# Patient Record
Sex: MALE | Race: WHITE | NOT HISPANIC OR LATINO | URBAN - METROPOLITAN AREA
[De-identification: names, ages, dates, MRNs, and addresses within clinical notes are randomized per-mention and may not be internally consistent; named-entity substitution may affect disease eponyms.]

---

## 2020-07-25 ENCOUNTER — TELEPHONE ENCOUNTER (OUTPATIENT)
Dept: URBAN - METROPOLITAN AREA CLINIC 13 | Facility: CLINIC | Age: 65
End: 2020-07-25

## 2020-07-25 RX ORDER — OMEPRAZOLE 40 MG/1
TAKE ONE CAPSULE BY MOUTH EVERY DAY CAPSULE, DELAYED RELEASE ORAL
Qty: 30 | Refills: 3 | OUTPATIENT
Start: 2012-04-17 | End: 2017-03-22

## 2020-07-26 ENCOUNTER — TELEPHONE ENCOUNTER (OUTPATIENT)
Dept: URBAN - METROPOLITAN AREA CLINIC 13 | Facility: CLINIC | Age: 65
End: 2020-07-26

## 2020-07-26 RX ORDER — METOPROLOL TARTRATE 25 MG/1
TAKE 1 TABLET DAILY TABLET, FILM COATED ORAL
Refills: 0 | Status: ACTIVE | COMMUNITY

## 2020-07-26 RX ORDER — METOPROLOL SUCCINATE 50 MG/1
TABLET, EXTENDED RELEASE ORAL
Qty: 30 | Refills: 0 | Status: ACTIVE | COMMUNITY
Start: 2018-12-06

## 2020-07-26 RX ORDER — VALACYCLOVIR 1 G/1
TABLET, FILM COATED ORAL
Qty: 14 | Refills: 0 | Status: ACTIVE | COMMUNITY
Start: 2019-04-03

## 2020-07-26 RX ORDER — CEPHALEXIN 500 MG/1
CAPSULE ORAL
Qty: 21 | Refills: 0 | Status: ACTIVE | COMMUNITY
Start: 2019-04-03

## 2020-07-26 RX ORDER — OMEPRAZOLE 40 MG/1
TAKE 1 CAPSULE BY MOUTH DAILY 30 MINUTES BEFORE BREAKFAST CAPSULE, DELAYED RELEASE ORAL
Qty: 90 | Refills: 3 | Status: ACTIVE | COMMUNITY
Start: 2017-03-22

## 2020-07-26 RX ORDER — ALLOPURINOL 300 MG/1
TAKE 1 TABLET BY MOUTH ONCE DAILY TABLET ORAL
Qty: 30 | Refills: 0 | Status: ACTIVE | COMMUNITY
Start: 2018-12-07

## 2020-09-02 ENCOUNTER — TELEPHONE ENCOUNTER (OUTPATIENT)
Dept: URBAN - METROPOLITAN AREA CLINIC 113 | Facility: CLINIC | Age: 65
End: 2020-09-02

## 2020-09-23 ENCOUNTER — TELEPHONE ENCOUNTER (OUTPATIENT)
Dept: URBAN - METROPOLITAN AREA CLINIC 113 | Facility: CLINIC | Age: 65
End: 2020-09-23

## 2020-09-28 ENCOUNTER — WEB ENCOUNTER (OUTPATIENT)
Dept: URBAN - METROPOLITAN AREA CLINIC 113 | Facility: CLINIC | Age: 65
End: 2020-09-28

## 2020-09-28 ENCOUNTER — LAB OUTSIDE AN ENCOUNTER (OUTPATIENT)
Dept: URBAN - METROPOLITAN AREA CLINIC 113 | Facility: CLINIC | Age: 65
End: 2020-09-28

## 2020-09-28 ENCOUNTER — OFFICE VISIT (OUTPATIENT)
Dept: URBAN - METROPOLITAN AREA CLINIC 113 | Facility: CLINIC | Age: 65
End: 2020-09-28
Payer: MEDICARE

## 2020-09-28 VITALS
TEMPERATURE: 98.2 F | RESPIRATION RATE: 18 BRPM | HEIGHT: 67 IN | DIASTOLIC BLOOD PRESSURE: 83 MMHG | WEIGHT: 226 LBS | HEART RATE: 68 BPM | SYSTOLIC BLOOD PRESSURE: 160 MMHG | BODY MASS INDEX: 35.47 KG/M2

## 2020-09-28 DIAGNOSIS — K22.70 BARRETT'S ESOPHAGUS WITHOUT DYSPLASIA: ICD-10-CM

## 2020-09-28 DIAGNOSIS — K76.0 FATTY LIVER: ICD-10-CM

## 2020-09-28 DIAGNOSIS — K21.9 GASTROESOPHAGEAL REFLUX DISEASE, ESOPHAGITIS PRESENCE NOT SPECIFIED: ICD-10-CM

## 2020-09-28 DIAGNOSIS — R74.8 ELEVATED LIVER ENZYMES: ICD-10-CM

## 2020-09-28 PROCEDURE — 83516 IMMUNOASSAY NONANTIBODY: CPT | Performed by: NURSE PRACTITIONER

## 2020-09-28 PROCEDURE — 99204 OFFICE O/P NEW MOD 45 MIN: CPT | Performed by: NURSE PRACTITIONER

## 2020-09-28 PROCEDURE — 82104 ALPHA-1-ANTITRYPSIN PHENO: CPT | Performed by: NURSE PRACTITIONER

## 2020-09-28 PROCEDURE — 99244 OFF/OP CNSLTJ NEW/EST MOD 40: CPT | Performed by: NURSE PRACTITIONER

## 2020-09-28 PROCEDURE — 82787 IGG 1 2 3 OR 4 EACH: CPT | Performed by: NURSE PRACTITIONER

## 2020-09-28 RX ORDER — OMEPRAZOLE 40 MG/1
TAKE 1 CAPSULE BY MOUTH DAILY 30 MINUTES BEFORE BREAKFAST CAPSULE, DELAYED RELEASE ORAL
Qty: 90 | Refills: 3 | Status: ACTIVE | COMMUNITY
Start: 2017-03-22

## 2020-09-28 RX ORDER — ALLOPURINOL 300 MG/1
TAKE 1 TABLET BY MOUTH ONCE DAILY TABLET ORAL
Qty: 30 | Refills: 0 | Status: ACTIVE | COMMUNITY
Start: 2018-12-07

## 2020-09-28 RX ORDER — AMLODIPINE BESYLATE 10 MG/1
1 TABLET TABLET ORAL ONCE A DAY
Status: ACTIVE | COMMUNITY

## 2020-09-28 NOTE — HPI-TODAY'S VISIT:
This is a 64-year-old male with a history of Rodriguez's esophagus, GERD and colon diverticulosis referred from Dr. Miranda for evaluation of liver enzyme elevation.    He was last seen in .  He was scheduled for an EGD which was canceled.  He did not return for follow-up.  Colon cancer screening is up-to-date and due in .    Labs 2020: BMP normal with exception of glucose 159.  LFTs: TB 0.4, ALP 94, , AST 66.  Hepatitis A antibody IgM positive, hepatitis B surface antigen negative, hepatitis B core antibody negative, hepatitis C antibody negative.  He may drink 1 beer a month.  He denies a family history of liver disease.  GERD is well controlled with Omeprazole.  He denies any other abdominal symptoms.  He has a cousin and an aunt who both  of colon cancer in their 70s.  He denies a history of colon cancer in first-degree relatives.

## 2020-10-19 ENCOUNTER — CLAIMS CREATED FROM THE CLAIM WINDOW (OUTPATIENT)
Dept: URBAN - METROPOLITAN AREA CLINIC 4 | Facility: CLINIC | Age: 65
End: 2020-10-19
Payer: MEDICARE

## 2020-10-19 ENCOUNTER — OFFICE VISIT (OUTPATIENT)
Dept: URBAN - METROPOLITAN AREA SURGERY CENTER 25 | Facility: SURGERY CENTER | Age: 65
End: 2020-10-19
Payer: MEDICARE

## 2020-10-19 DIAGNOSIS — T18.2XXA GASTRIC BEZOAR, INITIAL ENCOUNTER: ICD-10-CM

## 2020-10-19 DIAGNOSIS — K21.9 ACID REFLUX: ICD-10-CM

## 2020-10-19 DIAGNOSIS — K22.70 BARRETT ESOPHAGUS: ICD-10-CM

## 2020-10-19 DIAGNOSIS — K22.70 BARRETT'S ESOPHAGUS WITHOUT DYSPLASIA: ICD-10-CM

## 2020-10-19 PROCEDURE — G8907 PT DOC NO EVENTS ON DISCHARG: HCPCS | Performed by: INTERNAL MEDICINE

## 2020-10-19 PROCEDURE — 43239 EGD BIOPSY SINGLE/MULTIPLE: CPT | Performed by: INTERNAL MEDICINE

## 2020-10-19 PROCEDURE — 88305 TISSUE EXAM BY PATHOLOGIST: CPT | Performed by: PATHOLOGY

## 2020-10-19 RX ORDER — AMLODIPINE BESYLATE 10 MG/1
1 TABLET TABLET ORAL ONCE A DAY
Status: ACTIVE | COMMUNITY

## 2020-10-19 RX ORDER — OMEPRAZOLE 40 MG/1
TAKE 1 CAPSULE BY MOUTH DAILY 30 MINUTES BEFORE BREAKFAST CAPSULE, DELAYED RELEASE ORAL
Qty: 90 | Refills: 3 | Status: ACTIVE | COMMUNITY
Start: 2017-03-22

## 2020-10-19 RX ORDER — ALLOPURINOL 300 MG/1
TAKE 1 TABLET BY MOUTH ONCE DAILY TABLET ORAL
Qty: 30 | Refills: 0 | Status: ACTIVE | COMMUNITY
Start: 2018-12-07

## 2021-01-19 ENCOUNTER — OFFICE VISIT (OUTPATIENT)
Dept: URBAN - METROPOLITAN AREA CLINIC 113 | Facility: CLINIC | Age: 66
End: 2021-01-19
Payer: MEDICARE

## 2021-01-19 ENCOUNTER — LAB OUTSIDE AN ENCOUNTER (OUTPATIENT)
Dept: URBAN - METROPOLITAN AREA CLINIC 113 | Facility: CLINIC | Age: 66
End: 2021-01-19

## 2021-01-19 VITALS
DIASTOLIC BLOOD PRESSURE: 91 MMHG | WEIGHT: 235 LBS | SYSTOLIC BLOOD PRESSURE: 160 MMHG | HEIGHT: 67 IN | HEART RATE: 96 BPM | RESPIRATION RATE: 20 BRPM | BODY MASS INDEX: 36.88 KG/M2 | TEMPERATURE: 98.2 F

## 2021-01-19 DIAGNOSIS — K22.70 BARRETT'S ESOPHAGUS WITHOUT DYSPLASIA: ICD-10-CM

## 2021-01-19 DIAGNOSIS — K76.0 FATTY LIVER: ICD-10-CM

## 2021-01-19 DIAGNOSIS — R74.8 ELEVATED LIVER ENZYMES: ICD-10-CM

## 2021-01-19 DIAGNOSIS — K21.9 GASTROESOPHAGEAL REFLUX DISEASE, ESOPHAGITIS PRESENCE NOT SPECIFIED: ICD-10-CM

## 2021-01-19 PROCEDURE — 99214 OFFICE O/P EST MOD 30 MIN: CPT | Performed by: INTERNAL MEDICINE

## 2021-01-19 RX ORDER — LISINOPRIL 10 MG/1
1 TABLET TABLET ORAL ONCE A DAY
Status: ACTIVE | COMMUNITY

## 2021-01-19 RX ORDER — ALLOPURINOL 300 MG/1
TAKE 1 TABLET BY MOUTH ONCE DAILY TABLET ORAL
Qty: 30 | Refills: 0 | Status: ACTIVE | COMMUNITY
Start: 2018-12-07

## 2021-01-19 RX ORDER — OMEPRAZOLE 40 MG/1
TAKE 1 CAPSULE BY MOUTH DAILY 30 MINUTES BEFORE BREAKFAST CAPSULE, DELAYED RELEASE ORAL
Qty: 90 | Refills: 3 | Status: ACTIVE | COMMUNITY
Start: 2017-03-22

## 2021-01-19 NOTE — HPI-TODAY'S VISIT:
This is a 64-year-old male with a history of Rodriguez's esophagus, GERD and colon diverticulosis Initially referred from Dr. Miranda for evaluation of liver enzyme elevation.  He was last seen here in 2020.  Labs 2020: BMP normal with exception of glucose 159.  LFTs: TB 0.4, ALP 94, , AST 66.  Hepatitis A antibody IgM positive, hepatitis B surface antigen negative, hepatitis B core antibody negative, hepatitis C antibody negative.  He may drink 1 beer a month.  He denies a family history of liver disease.  GERD is well controlled with Omeprazole.  He denies any other abdominal symptoms.  He has a cousin and an aunt who both  of colon cancer in their 70s.  He denies a history of colon cancer in first-degree relatives.    Colon cancer screening is up-to-date and due in .    After his last visit, we ordered labs including antimitochondrial antibody, antinuclear antibody, anti-smooth muscle a body, iron studies, and a repeat set of liver enzymes.  He never had these done.  Because he has a long-standing history of reflux symptoms, and a history of Rodriguez's epithelium, he underwent upper GI endoscopy on 2020.  This did indeed reveal short segment Rodriguez's epithelium without dysplasia, which is confirmed by biopsy.  Notably, the patient was also found to have a gastric bezoar.  He returns today with episodic bouts of nausea and vomiting, including one that occurred last evening.  He states it 2 AM he awoke with pain in the center of his stomach, and had the sudden onset of nausea and vomiting subsequently, throughout most of the meal he had for dinner the night before as though it was untouched.  He then vomited bile.  He denies any hematemesis.  He's had episodes like this before.

## 2021-01-21 PROBLEM — 235675006: Status: ACTIVE | Noted: 2021-01-21

## 2021-04-20 ENCOUNTER — OFFICE VISIT (OUTPATIENT)
Dept: URBAN - METROPOLITAN AREA CLINIC 113 | Facility: CLINIC | Age: 66
End: 2021-04-20

## 2021-04-20 RX ORDER — ALLOPURINOL 300 MG/1
TAKE 1 TABLET BY MOUTH ONCE DAILY TABLET ORAL
Qty: 30 | Refills: 0 | Status: ACTIVE | COMMUNITY
Start: 2018-12-07

## 2021-04-20 RX ORDER — OMEPRAZOLE 40 MG/1
TAKE 1 CAPSULE BY MOUTH DAILY 30 MINUTES BEFORE BREAKFAST CAPSULE, DELAYED RELEASE ORAL
Qty: 90 | Refills: 3 | Status: ACTIVE | COMMUNITY
Start: 2017-03-22

## 2021-04-20 RX ORDER — LISINOPRIL 10 MG/1
1 TABLET TABLET ORAL ONCE A DAY
Status: ACTIVE | COMMUNITY

## 2021-07-09 ENCOUNTER — OFFICE VISIT (OUTPATIENT)
Dept: URBAN - METROPOLITAN AREA CLINIC 113 | Facility: CLINIC | Age: 66
End: 2021-07-09
Payer: MEDICARE

## 2021-07-09 VITALS
BODY MASS INDEX: 36.1 KG/M2 | HEIGHT: 67 IN | DIASTOLIC BLOOD PRESSURE: 91 MMHG | SYSTOLIC BLOOD PRESSURE: 160 MMHG | HEART RATE: 68 BPM | RESPIRATION RATE: 18 BRPM | WEIGHT: 230 LBS | TEMPERATURE: 98.1 F

## 2021-07-09 DIAGNOSIS — K22.70 BARRETT'S ESOPHAGUS WITHOUT DYSPLASIA: ICD-10-CM

## 2021-07-09 DIAGNOSIS — K31.84 GASTROPARESIS: ICD-10-CM

## 2021-07-09 DIAGNOSIS — R74.8 ELEVATED LIVER ENZYMES: ICD-10-CM

## 2021-07-09 DIAGNOSIS — K21.9 GASTROESOPHAGEAL REFLUX DISEASE, ESOPHAGITIS PRESENCE NOT SPECIFIED: ICD-10-CM

## 2021-07-09 DIAGNOSIS — K76.0 FATTY LIVER: ICD-10-CM

## 2021-07-09 PROCEDURE — 99214 OFFICE O/P EST MOD 30 MIN: CPT | Performed by: INTERNAL MEDICINE

## 2021-07-09 RX ORDER — OMEPRAZOLE 40 MG/1
TAKE 1 CAPSULE BY MOUTH DAILY 30 MINUTES BEFORE BREAKFAST CAPSULE, DELAYED RELEASE ORAL
Qty: 90 | Refills: 3 | Status: ACTIVE | COMMUNITY
Start: 2017-03-22

## 2021-07-09 RX ORDER — LISINOPRIL 10 MG/1
1 TABLET TABLET ORAL ONCE A DAY
Status: ACTIVE | COMMUNITY

## 2021-07-09 RX ORDER — ALLOPURINOL 300 MG/1
TAKE 1 TABLET BY MOUTH ONCE DAILY TABLET ORAL
Qty: 30 | Refills: 0 | Status: ACTIVE | COMMUNITY
Start: 2018-12-07

## 2021-07-09 NOTE — HPI-TODAY'S VISIT:
This is a 65-year-old male with a history of Rodriguez's esophagus, GERD and colon diverticulosis Initially referred from Dr. Miranda for evaluation of liver enzyme elevation.  He was last seen here on 2021.  Labs 2020: BMP normal with exception of glucose 159.  LFTs: TB 0.4, ALP 94, , AST 66.  Hepatitis A antibody IgM positive, hepatitis B surface antigen negative, hepatitis B core antibody negative, hepatitis C antibody negative.  Other labs are ordered at the time of that last visit, including Antimitochondrial antibody, antinuclear antibody, anti-smooth muscle Antibody, etc.  He may drink 1 beer a month.  He denies a family history of liver disease.  GERD is well controlled with Omeprazole.  He denies any other abdominal symptoms.    Colon cancer screening is up-to-date and due in .  He does have a family history of colon cancerIn a cousin and an aunt, both of them  of colon cancer in her 70s.  There are no first degree family members with colon cancer.   Because he has a long-standing history of reflux symptoms, and a history of Rodriguez's epithelium, he underwent upper GI endoscopy on 2020.  This did indeed reveal short segment Rodriguez's epithelium without dysplasia, which is confirmed by biopsy.  Notably, the patient was also found to have a gastric bezoar.  He returned In 2021 with episodic bouts of nausea and vomiting. He denies any hematemesis.  He's had episodes like this before. There was a presumption that this represented gastroparesis, based on the finding of a gastric bezoar.  The plan was to empirically treat him with domperidone.  Because of his liver enzyme elevation, we also elected to complete his elevated liver enzyme workup. Repeat liveer enzymes were almost normal, with an AST of 33, ALT of 61, iron saturation of 24% and a weakly positivve antinuclear anybody at L1 to 20 titer.  His antimitochondrial antibody was negative.  The patient elected not to begin domperidone after he read the side effect profile of the medication.  He had a kidney stone removed by Dr. Galdamez of urology recently, and his nausea and vomiting completely resolved.  The patient has no GI complaints today and is doing well.

## 2022-04-07 ENCOUNTER — TELEPHONE ENCOUNTER (OUTPATIENT)
Dept: URBAN - METROPOLITAN AREA CLINIC 6 | Facility: CLINIC | Age: 67
End: 2022-04-07

## 2022-05-10 ENCOUNTER — OFFICE VISIT (OUTPATIENT)
Dept: URBAN - METROPOLITAN AREA CLINIC 107 | Facility: CLINIC | Age: 67
End: 2022-05-10

## 2022-05-10 ENCOUNTER — TELEPHONE ENCOUNTER (OUTPATIENT)
Dept: URBAN - METROPOLITAN AREA CLINIC 113 | Facility: CLINIC | Age: 67
End: 2022-05-10

## 2022-05-10 ENCOUNTER — LAB OUTSIDE AN ENCOUNTER (OUTPATIENT)
Dept: URBAN - METROPOLITAN AREA CLINIC 107 | Facility: CLINIC | Age: 67
End: 2022-05-10

## 2022-05-10 PROBLEM — 197321007: Status: ACTIVE | Noted: 2020-09-28

## 2022-05-10 PROBLEM — 707724006: Status: ACTIVE | Noted: 2020-09-28

## 2022-05-10 RX ORDER — LISINOPRIL 10 MG/1
1 TABLET TABLET ORAL ONCE A DAY
Status: ACTIVE | COMMUNITY

## 2022-05-10 RX ORDER — ALLOPURINOL 300 MG/1
TAKE 1 TABLET BY MOUTH ONCE DAILY TABLET ORAL
Qty: 30 | Refills: 0 | Status: ACTIVE | COMMUNITY
Start: 2018-12-07

## 2022-05-10 RX ORDER — OMEPRAZOLE 40 MG/1
TAKE 1 CAPSULE BY MOUTH DAILY 30 MINUTES BEFORE BREAKFAST CAPSULE, DELAYED RELEASE ORAL
Qty: 90 | Refills: 3 | Status: ACTIVE | COMMUNITY
Start: 2017-03-22

## 2022-05-10 NOTE — HPI-TODAY'S VISIT:
This is a 66-year-old male with a history of GERD, Rodriguez's esophagus due surveillance in October 2023, colon diverticulosis, gastroparesis, and liver enzyme elevation associated with fatty liver presenting for follow-up to schedule a screening colonoscopy. He was last seen 7/9/2021.  He did not have any GI complaints and was doing well.  He had elected not to begin domperidone as he was asymptomatic.  He had completed a work-up for liver enzyme elevation revealing only fatty liver.  He declined consideration for liver biopsy.  He was instructed to begin efforts at weight reduction.  He was to continue omeprazole 40 mg daily for acid reflux.

## 2022-10-28 ENCOUNTER — OFFICE VISIT (OUTPATIENT)
Dept: URBAN - METROPOLITAN AREA CLINIC 113 | Facility: CLINIC | Age: 67
End: 2022-10-28
Payer: MEDICARE

## 2022-10-28 ENCOUNTER — LAB OUTSIDE AN ENCOUNTER (OUTPATIENT)
Dept: URBAN - METROPOLITAN AREA CLINIC 113 | Facility: CLINIC | Age: 67
End: 2022-10-28

## 2022-10-28 VITALS
RESPIRATION RATE: 18 BRPM | TEMPERATURE: 98 F | HEART RATE: 60 BPM | SYSTOLIC BLOOD PRESSURE: 133 MMHG | BODY MASS INDEX: 35.94 KG/M2 | HEIGHT: 67 IN | DIASTOLIC BLOOD PRESSURE: 74 MMHG | WEIGHT: 229 LBS

## 2022-10-28 DIAGNOSIS — Z12.11 COLON CANCER SCREENING: ICD-10-CM

## 2022-10-28 DIAGNOSIS — K21.9 GASTROESOPHAGEAL REFLUX DISEASE, ESOPHAGITIS PRESENCE NOT SPECIFIED: ICD-10-CM

## 2022-10-28 DIAGNOSIS — K22.70 BARRETT'S ESOPHAGUS WITHOUT DYSPLASIA: ICD-10-CM

## 2022-10-28 PROBLEM — 235595009: Status: ACTIVE | Noted: 2020-09-28

## 2022-10-28 PROBLEM — 302914006: Status: ACTIVE | Noted: 2020-09-28

## 2022-10-28 PROCEDURE — 99213 OFFICE O/P EST LOW 20 MIN: CPT | Performed by: NURSE PRACTITIONER

## 2022-10-28 RX ORDER — OMEPRAZOLE 40 MG/1
TAKE 1 CAPSULE BY MOUTH DAILY 30 MINUTES BEFORE BREAKFAST CAPSULE, DELAYED RELEASE ORAL
Qty: 90 | Refills: 3 | Status: ACTIVE | COMMUNITY
Start: 2017-03-22

## 2022-10-28 RX ORDER — SODIUM, POTASSIUM,MAG SULFATES 17.5-3.13G
177ML SOLUTION, RECONSTITUTED, ORAL ORAL
Qty: 1 | OUTPATIENT
Start: 2022-10-28 | End: 2022-10-30

## 2022-10-28 RX ORDER — ATORVASTATIN CALCIUM 10 MG/1
1 TABLET TABLET, FILM COATED ORAL ONCE A DAY
Status: ACTIVE | COMMUNITY

## 2022-10-28 RX ORDER — LISINOPRIL 10 MG/1
1 TABLET TABLET ORAL ONCE A DAY
Status: ACTIVE | COMMUNITY

## 2022-10-28 RX ORDER — ALLOPURINOL 300 MG/1
TAKE 1 TABLET BY MOUTH ONCE DAILY TABLET ORAL
Qty: 30 | Refills: 0 | Status: ACTIVE | COMMUNITY
Start: 2018-12-07

## 2022-10-28 RX ORDER — AMLODIPINE BESYLATE 10 MG/1
1 TABLET TABLET ORAL ONCE A DAY
Status: ACTIVE | COMMUNITY

## 2022-10-28 NOTE — HPI-TODAY'S VISIT:
This is a 66-year-old male with a history of GERD, Rodriguez's esophagus due surveillance in October 2023, colon diverticulosis, gastroparesis, and liver enzyme elevation associated with fatty liver presenting for follow-up to schedule a screening colonoscopy. He was last seen 7/9/2021.  He did not have any GI complaints and was doing well.  He had elected not to begin domperidone as he was asymptomatic.  He had completed a work-up for liver enzyme elevation revealing only fatty liver.  He declined consideration for liver biopsy.  He was instructed to begin efforts at weight reduction.  He was to continue omeprazole 40 mg daily for acid reflux. He is taking omeprazole 40 mg daily.  Acid reflux is well controlled.  He occasionally has diet dependent diarrhea or aching in the central part of his stomach.  This is usually associated with eating Mexican food.  He is otherwise doing well.  He denies nausea or vomiting.  He is ready to schedule a screening colonoscopy.

## 2022-12-29 ENCOUNTER — CLAIMS CREATED FROM THE CLAIM WINDOW (OUTPATIENT)
Dept: URBAN - METROPOLITAN AREA CLINIC 4 | Facility: CLINIC | Age: 67
End: 2022-12-29
Payer: MEDICARE

## 2022-12-29 ENCOUNTER — OFFICE VISIT (OUTPATIENT)
Dept: URBAN - METROPOLITAN AREA SURGERY CENTER 25 | Facility: SURGERY CENTER | Age: 67
End: 2022-12-29
Payer: MEDICARE

## 2022-12-29 DIAGNOSIS — D12.3 BENIGN NEOPLASM OF TRANSVERSE COLON: ICD-10-CM

## 2022-12-29 DIAGNOSIS — D12.4 BENIGN NEOPLASM OF DESCENDING COLON: ICD-10-CM

## 2022-12-29 DIAGNOSIS — D12.6 COLON ADENOMAS: ICD-10-CM

## 2022-12-29 DIAGNOSIS — Z12.11 COLON CANCER SCREENING: ICD-10-CM

## 2022-12-29 PROCEDURE — 88305 TISSUE EXAM BY PATHOLOGIST: CPT | Performed by: PATHOLOGY

## 2022-12-29 PROCEDURE — G8907 PT DOC NO EVENTS ON DISCHARG: HCPCS | Performed by: INTERNAL MEDICINE

## 2022-12-29 PROCEDURE — 45385 COLONOSCOPY W/LESION REMOVAL: CPT | Performed by: INTERNAL MEDICINE

## 2022-12-29 RX ORDER — AMLODIPINE BESYLATE 10 MG/1
1 TABLET TABLET ORAL ONCE A DAY
Status: ACTIVE | COMMUNITY

## 2022-12-29 RX ORDER — ALLOPURINOL 300 MG/1
TAKE 1 TABLET BY MOUTH ONCE DAILY TABLET ORAL
Qty: 30 | Refills: 0 | Status: ACTIVE | COMMUNITY
Start: 2018-12-07

## 2022-12-29 RX ORDER — OMEPRAZOLE 40 MG/1
TAKE 1 CAPSULE BY MOUTH DAILY 30 MINUTES BEFORE BREAKFAST CAPSULE, DELAYED RELEASE ORAL
Qty: 90 | Refills: 3 | Status: ACTIVE | COMMUNITY
Start: 2017-03-22

## 2022-12-29 RX ORDER — LISINOPRIL 10 MG/1
1 TABLET TABLET ORAL ONCE A DAY
Status: ACTIVE | COMMUNITY

## 2022-12-29 RX ORDER — ATORVASTATIN CALCIUM 10 MG/1
1 TABLET TABLET, FILM COATED ORAL ONCE A DAY
Status: ACTIVE | COMMUNITY

## 2023-01-09 ENCOUNTER — TELEPHONE ENCOUNTER (OUTPATIENT)
Dept: URBAN - METROPOLITAN AREA CLINIC 113 | Facility: CLINIC | Age: 68
End: 2023-01-09

## 2023-01-09 RX ORDER — POLYETHYLENE GLYCOL 3350, SODIUM CHLORIDE, SODIUM BICARBONATE, POTASSIUM CHLORIDE 420; 11.2; 5.72; 1.48 G/4L; G/4L; G/4L; G/4L
AS DIRECTED POWDER, FOR SOLUTION ORAL ONCE
Qty: 420 GM | Refills: 0 | OUTPATIENT
Start: 2023-01-09 | End: 2023-01-10

## 2023-01-09 RX ORDER — AMLODIPINE BESYLATE 10 MG/1
1 TABLET TABLET ORAL ONCE A DAY
Status: ACTIVE | COMMUNITY

## 2023-01-09 RX ORDER — ALLOPURINOL 300 MG/1
TAKE 1 TABLET BY MOUTH ONCE DAILY TABLET ORAL
Qty: 30 | Refills: 0 | Status: ACTIVE | COMMUNITY
Start: 2018-12-07

## 2023-01-09 RX ORDER — LISINOPRIL 10 MG/1
1 TABLET TABLET ORAL ONCE A DAY
Status: ACTIVE | COMMUNITY

## 2023-01-09 RX ORDER — OMEPRAZOLE 40 MG/1
TAKE 1 CAPSULE BY MOUTH DAILY 30 MINUTES BEFORE BREAKFAST CAPSULE, DELAYED RELEASE ORAL
Qty: 90 | Refills: 3 | Status: ACTIVE | COMMUNITY
Start: 2017-03-22

## 2023-01-09 RX ORDER — ATORVASTATIN CALCIUM 10 MG/1
1 TABLET TABLET, FILM COATED ORAL ONCE A DAY
Status: ACTIVE | COMMUNITY

## 2023-01-23 PROBLEM — 305058001: Status: ACTIVE | Noted: 2022-10-28

## 2023-10-04 ENCOUNTER — OFFICE VISIT (OUTPATIENT)
Dept: URBAN - METROPOLITAN AREA CLINIC 113 | Facility: CLINIC | Age: 68
End: 2023-10-04

## 2023-12-28 ENCOUNTER — LAB OUTSIDE AN ENCOUNTER (OUTPATIENT)
Dept: URBAN - METROPOLITAN AREA CLINIC 113 | Facility: CLINIC | Age: 68
End: 2023-12-28

## 2023-12-28 ENCOUNTER — OFFICE VISIT (OUTPATIENT)
Dept: URBAN - METROPOLITAN AREA CLINIC 113 | Facility: CLINIC | Age: 68
End: 2023-12-28
Payer: MEDICARE

## 2023-12-28 VITALS
DIASTOLIC BLOOD PRESSURE: 64 MMHG | RESPIRATION RATE: 20 BRPM | TEMPERATURE: 98.5 F | HEIGHT: 67 IN | HEART RATE: 53 BPM | WEIGHT: 217 LBS | SYSTOLIC BLOOD PRESSURE: 124 MMHG | BODY MASS INDEX: 34.06 KG/M2

## 2023-12-28 DIAGNOSIS — K21.9 GASTROESOPHAGEAL REFLUX DISEASE, ESOPHAGITIS PRESENCE NOT SPECIFIED: ICD-10-CM

## 2023-12-28 DIAGNOSIS — K57.30 COLON, DIVERTICULOSIS: ICD-10-CM

## 2023-12-28 DIAGNOSIS — Z86.010 HISTORY OF ADENOMATOUS POLYP OF COLON: ICD-10-CM

## 2023-12-28 DIAGNOSIS — K22.70 BARRETT'S ESOPHAGUS WITHOUT DYSPLASIA: ICD-10-CM

## 2023-12-28 PROCEDURE — 99213 OFFICE O/P EST LOW 20 MIN: CPT | Performed by: NURSE PRACTITIONER

## 2023-12-28 RX ORDER — AMLODIPINE BESYLATE 10 MG/1
1 TABLET TABLET ORAL ONCE A DAY
Status: ACTIVE | COMMUNITY

## 2023-12-28 RX ORDER — OMEPRAZOLE 40 MG/1
1 CAPSULE 30 MINUTES BEFORE MORNING MEAL CAPSULE, DELAYED RELEASE ORAL ONCE A DAY
Qty: 90 | Refills: 3 | OUTPATIENT
Start: 2023-12-28

## 2023-12-28 RX ORDER — LISINOPRIL 10 MG/1
1 TABLET TABLET ORAL ONCE A DAY
Status: ACTIVE | COMMUNITY

## 2023-12-28 RX ORDER — ALLOPURINOL 300 MG/1
TAKE 1 TABLET BY MOUTH ONCE DAILY TABLET ORAL
Qty: 30 | Refills: 0 | Status: ACTIVE | COMMUNITY
Start: 2018-12-07

## 2023-12-28 RX ORDER — ATORVASTATIN CALCIUM 10 MG/1
1 TABLET TABLET, FILM COATED ORAL ONCE A DAY
Status: ACTIVE | COMMUNITY

## 2023-12-28 RX ORDER — OMEPRAZOLE 40 MG/1
TAKE 1 CAPSULE BY MOUTH DAILY 30 MINUTES BEFORE BREAKFAST CAPSULE, DELAYED RELEASE ORAL
Qty: 90 | Refills: 3 | Status: ACTIVE | COMMUNITY
Start: 2017-03-22

## 2023-12-28 NOTE — HPI-TODAY'S VISIT:
This is a 68-year-old male with a history of GERD, Rodriguez's esophagus due surveillance in October 2023, colon diverticulosis, internal hemorrhoids, and adenomatous colon polyps due surveillance in 2027 presenting for follow-up.  He was last seen in the office 10/28/2022 for colon cancer screening.  GERD was controlled with omeprazole 40 mg daily.  He was due EGD for Rodriguez's surveillance in October 2023.  He was scheduled for a colonoscopy performed 12/29/2022 notable for diverticulosis and removal of 2 descending and transverse 4 to 5 mm tubular adenomas.  Surveillance recommended in 2027.   He is taking pantoprazole 40 mg daily.  Acid reflux is well-controlled.  He has questions about long-term PPI use.  He denies any other abdominal symptoms.

## 2023-12-28 NOTE — HPI-OTHER HISTORIES
Colonoscopy performed 12/29/2022 notable for diverticulosis and removal of 2 descending and transverse 4 to 5 mm tubular adenomas. Surveillance recommended in 2027. recall set

## 2024-03-11 ENCOUNTER — LAB (OUTPATIENT)
Dept: URBAN - METROPOLITAN AREA CLINIC 4 | Facility: CLINIC | Age: 69
End: 2024-03-11
Payer: MEDICARE

## 2024-03-11 ENCOUNTER — EGD (OUTPATIENT)
Dept: URBAN - METROPOLITAN AREA SURGERY CENTER 25 | Facility: SURGERY CENTER | Age: 69
End: 2024-03-11
Payer: MEDICARE

## 2024-03-11 DIAGNOSIS — K22.719 BARRETT'S ESOPHAGUS WITH DYSPLASIA: ICD-10-CM

## 2024-03-11 DIAGNOSIS — K31.89 OTHER DISEASES OF STOMACH AND DUODENUM: ICD-10-CM

## 2024-03-11 DIAGNOSIS — K21.9 ACID REFLUX: ICD-10-CM

## 2024-03-11 DIAGNOSIS — R12 BURNING REFLUX: ICD-10-CM

## 2024-03-11 DIAGNOSIS — K31.7 POLYP OF STOMACH AND DUODENUM: ICD-10-CM

## 2024-03-11 DIAGNOSIS — K31.7 BENIGN GASTRIC POLYP: ICD-10-CM

## 2024-03-11 DIAGNOSIS — K31.89 ACHYLIA: ICD-10-CM

## 2024-03-11 PROCEDURE — 88342 IMHCHEM/IMCYTCHM 1ST ANTB: CPT | Performed by: PATHOLOGY

## 2024-03-11 PROCEDURE — 43239 EGD BIOPSY SINGLE/MULTIPLE: CPT | Performed by: INTERNAL MEDICINE

## 2024-03-11 PROCEDURE — 88341 IMHCHEM/IMCYTCHM EA ADD ANTB: CPT | Performed by: PATHOLOGY

## 2024-03-11 PROCEDURE — 88305 TISSUE EXAM BY PATHOLOGIST: CPT | Performed by: PATHOLOGY

## 2024-03-11 PROCEDURE — 43251 EGD REMOVE LESION SNARE: CPT | Performed by: INTERNAL MEDICINE

## 2024-03-11 RX ORDER — AMLODIPINE BESYLATE 10 MG/1
1 TABLET TABLET ORAL ONCE A DAY
Status: ACTIVE | COMMUNITY

## 2024-03-11 RX ORDER — LISINOPRIL 10 MG/1
1 TABLET TABLET ORAL ONCE A DAY
Status: ACTIVE | COMMUNITY

## 2024-03-11 RX ORDER — ATORVASTATIN CALCIUM 10 MG/1
1 TABLET TABLET, FILM COATED ORAL ONCE A DAY
Status: ACTIVE | COMMUNITY

## 2024-03-11 RX ORDER — ALLOPURINOL 300 MG/1
TAKE 1 TABLET BY MOUTH ONCE DAILY TABLET ORAL
Qty: 30 | Refills: 0 | Status: ACTIVE | COMMUNITY
Start: 2018-12-07

## 2024-03-11 RX ORDER — OMEPRAZOLE 40 MG/1
1 CAPSULE 30 MINUTES BEFORE MORNING MEAL CAPSULE, DELAYED RELEASE ORAL ONCE A DAY
Qty: 90 | Refills: 3 | Status: ACTIVE | COMMUNITY
Start: 2023-12-28

## 2024-03-11 RX ORDER — OMEPRAZOLE 40 MG/1
TAKE 1 CAPSULE BY MOUTH DAILY 30 MINUTES BEFORE BREAKFAST CAPSULE, DELAYED RELEASE ORAL
Qty: 90 | Refills: 3 | Status: ACTIVE | COMMUNITY
Start: 2017-03-22

## 2024-04-15 ENCOUNTER — OV EP (OUTPATIENT)
Dept: URBAN - METROPOLITAN AREA CLINIC 113 | Facility: CLINIC | Age: 69
End: 2024-04-15
Payer: MEDICARE

## 2024-04-15 VITALS
BODY MASS INDEX: 33.9 KG/M2 | TEMPERATURE: 98.1 F | RESPIRATION RATE: 16 BRPM | WEIGHT: 216 LBS | DIASTOLIC BLOOD PRESSURE: 67 MMHG | SYSTOLIC BLOOD PRESSURE: 138 MMHG | HEART RATE: 78 BPM | HEIGHT: 67 IN

## 2024-04-15 DIAGNOSIS — K21.9 GASTROESOPHAGEAL REFLUX DISEASE, ESOPHAGITIS PRESENCE NOT SPECIFIED: ICD-10-CM

## 2024-04-15 DIAGNOSIS — K22.70 BARRETT'S ESOPHAGUS WITHOUT DYSPLASIA: ICD-10-CM

## 2024-04-15 DIAGNOSIS — Z86.010 HISTORY OF ADENOMATOUS POLYP OF COLON: ICD-10-CM

## 2024-04-15 PROCEDURE — 99213 OFFICE O/P EST LOW 20 MIN: CPT | Performed by: NURSE PRACTITIONER

## 2024-04-15 RX ORDER — ALLOPURINOL 300 MG/1
TAKE 1 TABLET BY MOUTH ONCE DAILY TABLET ORAL
Qty: 30 | Refills: 0 | Status: ACTIVE | COMMUNITY
Start: 2018-12-07

## 2024-04-15 RX ORDER — AMLODIPINE BESYLATE 10 MG/1
1 TABLET TABLET ORAL ONCE A DAY
Status: ACTIVE | COMMUNITY

## 2024-04-15 RX ORDER — OMEPRAZOLE 40 MG/1
1 CAPSULE 30 MINUTES BEFORE MORNING MEAL CAPSULE, DELAYED RELEASE ORAL ONCE A DAY
Qty: 90 | Refills: 3 | Status: ON HOLD | COMMUNITY
Start: 2023-12-28

## 2024-04-15 RX ORDER — LISINOPRIL 10 MG/1
1 TABLET TABLET ORAL ONCE A DAY
Status: ON HOLD | COMMUNITY

## 2024-04-15 RX ORDER — OMEPRAZOLE 40 MG/1
TAKE 1 CAPSULE BY MOUTH DAILY 30 MINUTES BEFORE BREAKFAST CAPSULE, DELAYED RELEASE ORAL
Qty: 90 | Refills: 3 | Status: ACTIVE | COMMUNITY
Start: 2017-03-22

## 2024-04-15 RX ORDER — ATORVASTATIN CALCIUM 10 MG/1
1 TABLET TABLET, FILM COATED ORAL ONCE A DAY
Status: ACTIVE | COMMUNITY

## 2024-04-15 NOTE — HPI-OTHER HISTORIES
EGD 3/11/2024: Esophageal mucosal changes secondary to established long segment Rodriguez's status post biopsy, a single 10 mm gastric polyp resected and retrieved, multiple gastric polyps status post biopsy. Pathology: Stomach body polyps were fundic gland polyps. Stomach cardia polypectomy: pit hyperplasia, distortion and inflammation, probably from the top of the proximal most gastric folds. No evidence of H. pylori or intestinal metaplasia. Lower third esophageal biopsies demonstrated squamocolumnar mucosa with focal intestinal metaplasia arising in a background of reflux type esophagitis, consistent with Rodriguez's esophagus. No dysplasia. Surveillance recommended in 2027. recall set Colonoscopy performed 12/29/2022 notable for diverticulosis and removal of 2 descending and transverse 4 to 5 mm tubular adenomas. Surveillance recommended in 2027. recall set

## 2024-04-15 NOTE — HPI-TODAY'S VISIT:
This is a 68-year-old male with a history of GERD, Rodriguez's esophagus, colon diverticulosis, internal hemorrhoids, and adenomatous colon polyps due surveillance in 2027 presenting for follow-up after EGD.  He was last seen in the office 12/28/2023.  GERD was controlled with pantoprazole 40 mg daily.  He was due surveillance EGD because of a history of Rodriguez's esophagus.  This was scheduled.  Colon diverticulosis was discussed.  Fiber was recommended.  EGD 3/11/2024: Esophageal mucosal changes secondary to established long segment Rodriguez's status post biopsy, a single 10 mm gastric polyp resected and retrieved, multiple gastric polyps status post biopsy.  Pathology: Stomach body polyps were fundic gland polyps.  Stomach cardia polypectomy: pit hyperplasia, distortion and inflammation, probably from the top of the proximal most gastric folds.  No evidence of H. pylori or intestinal metaplasia.  Lower third esophageal biopsies demonstrated squamocolumnar mucosa with focal intestinal metaplasia arising in a background of reflux type esophagitis, consistent with Rodriguez's esophagus.  No dysplasia.  Surveillance recommended in 2027.  He denies any abdominal symptoms.  He is under the care of cardiology and has routine follow-up every 3 months with Dr. Mitchell.  He reports a recent negative stress test and echocardiogram.  Of note, he is unaware that he has a murmur.

## 2025-05-02 ENCOUNTER — DASHBOARD ENCOUNTERS (OUTPATIENT)
Age: 70
End: 2025-05-02

## 2025-05-02 ENCOUNTER — LAB OUTSIDE AN ENCOUNTER (OUTPATIENT)
Dept: URBAN - METROPOLITAN AREA CLINIC 113 | Facility: CLINIC | Age: 70
End: 2025-05-02

## 2025-05-02 ENCOUNTER — OFFICE VISIT (OUTPATIENT)
Dept: URBAN - METROPOLITAN AREA CLINIC 113 | Facility: CLINIC | Age: 70
End: 2025-05-02
Payer: MEDICARE

## 2025-05-02 DIAGNOSIS — R68.81 EARLY SATIETY: ICD-10-CM

## 2025-05-02 DIAGNOSIS — R11.2 NAUSEA AND VOMITING IN ADULT: ICD-10-CM

## 2025-05-02 DIAGNOSIS — K21.9 GASTROESOPHAGEAL REFLUX DISEASE, ESOPHAGITIS PRESENCE NOT SPECIFIED: ICD-10-CM

## 2025-05-02 PROBLEM — 16932000: Status: ACTIVE | Noted: 2025-05-02

## 2025-05-02 PROBLEM — 442076002: Status: ACTIVE | Noted: 2025-05-02

## 2025-05-02 PROCEDURE — 99214 OFFICE O/P EST MOD 30 MIN: CPT | Performed by: NURSE PRACTITIONER

## 2025-05-02 RX ORDER — ALLOPURINOL 300 MG/1
TAKE 1 TABLET BY MOUTH ONCE DAILY TABLET ORAL
Qty: 30 | Refills: 0 | Status: ACTIVE | COMMUNITY
Start: 2018-12-07

## 2025-05-02 RX ORDER — OMEPRAZOLE 40 MG/1
TAKE 1 CAPSULE BY MOUTH DAILY 30 MINUTES BEFORE BREAKFAST CAPSULE, DELAYED RELEASE ORAL TWICE DAILY
Qty: 60 | Refills: 3
Start: 2017-03-22

## 2025-05-02 RX ORDER — ONDANSETRON 8 MG/1
1 TABLET ON THE TONGUE AND ALLOW TO DISSOLVE AS NEEDED TABLET, ORALLY DISINTEGRATING ORAL ONCE A DAY
Qty: 30 | Refills: 3 | OUTPATIENT
Start: 2025-05-02

## 2025-05-02 RX ORDER — PROMETHAZINE HYDROCHLORIDE 12.5 MG/1
1-2 TABLETS TABLET ORAL
Qty: 30 | Refills: 1 | OUTPATIENT
Start: 2025-05-02

## 2025-05-02 RX ORDER — AMLODIPINE BESYLATE 10 MG/1
1 TABLET TABLET ORAL ONCE A DAY
Status: ACTIVE | COMMUNITY

## 2025-05-02 RX ORDER — OMEPRAZOLE 40 MG/1
1 CAPSULE 30 MINUTES BEFORE MORNING MEAL CAPSULE, DELAYED RELEASE ORAL ONCE A DAY
Qty: 90 | Refills: 3 | Status: ON HOLD | COMMUNITY
Start: 2023-12-28

## 2025-05-02 RX ORDER — ATORVASTATIN CALCIUM 10 MG/1
1 TABLET TABLET, FILM COATED ORAL ONCE A DAY
Status: ACTIVE | COMMUNITY

## 2025-05-02 RX ORDER — OMEPRAZOLE 40 MG/1
TAKE 1 CAPSULE BY MOUTH DAILY 30 MINUTES BEFORE BREAKFAST CAPSULE, DELAYED RELEASE ORAL
Qty: 90 | Refills: 3 | Status: ACTIVE | COMMUNITY
Start: 2017-03-22

## 2025-05-02 RX ORDER — LISINOPRIL 10 MG/1
1 TABLET TABLET ORAL ONCE A DAY
Status: ON HOLD | COMMUNITY

## 2025-05-02 NOTE — HPI-OTHER HISTORIES
Labs 4/30/2025:CBC: WBC 7.3, hemoglobin 14.3, MCV 94, platelet 224. BMP normal with the exception of glucose 154. LFTs normal TB 0.6, ALP 90, ALT 25, AST 21. Lipid panel normal with the exception of HDL 38. Hemoglobin A1c 6.3.  EGD 3/11/2024: Esophageal mucosal changes secondary to established long segment Rodriguez's status post biopsy, a single 10 mm gastric polyp resected and retrieved, multiple gastric polyps status post biopsy. Pathology: Stomach body polyps were fundic gland polyps. Stomach cardia polypectomy: pit hyperplasia, distortion and inflammation, probably from the top of the proximal most gastric folds. No evidence of H. pylori or intestinal metaplasia. Lower third esophageal biopsies demonstrated squamocolumnar mucosa with focal intestinal metaplasia arising in a background of reflux type esophagitis, consistent with Rodriguez's esophagus. No dysplasia. Surveillance recommended in 2027. recall set   Colonoscopy performed 12/29/2022 notable for diverticulosis and removal of 2 descending and transverse 4 to 5 mm tubular adenomas. Surveillance recommended in 2027. recall set

## 2025-05-02 NOTE — HPI-TODAY'S VISIT:
This is a 69-year-old male with a history of GERD, Rodriguez's esophagus due surveillance in 2027, colon diverticulosis, internal hemorrhoids, adenomatous colon polyps due surveillance in 2027 presenting for evaluation of nausea and vomiting. He was last seen 4/15/2024.  Acid reflux symptoms were controlled with omeprazole 40 mg daily.  He had undergone a recent EGD notable for long segment Rodriguez's without dysplasia.  Surveillance recommended in 2027.  Labs 4/30/2025:CBC: WBC 7.3, hemoglobin 14.3, MCV 94, platelet 224.  BMP normal with the exception of glucose 154.  LFTs normal TB 0.6, ALP 90, ALT 25, AST 21.  Lipid panel normal with the exception of HDL 38.  Hemoglobin A1c 6.3.  He reports an 11-month history of early satiety.  He denies heartburn, regurgitation, or abdominal pain.  He is also experienced episodic vomiting.  He has nausea and vomiting once a week and will have persistent vomiting for hours initially producing food progressing to vomiting bile.  These episodes occur at random or 45 to 60 minutes after a meal.  He reports symptoms that occur after going to bed at night.  He denies associated pain.  He denies acid reflux symptoms.  He is having regular, normal bowel movements.  Stool color has changed to somewhat gray.  He denies other abdominal symptoms.  NSAID use is seldom.  He has tried ondansetron without improvement of symptoms.  He is taking omeprazole 40 mg daily.

## 2025-05-03 ENCOUNTER — TELEPHONE ENCOUNTER (OUTPATIENT)
Dept: URBAN - METROPOLITAN AREA CLINIC 113 | Facility: CLINIC | Age: 70
End: 2025-05-03

## 2025-05-09 ENCOUNTER — OFFICE VISIT (OUTPATIENT)
Dept: URBAN - METROPOLITAN AREA SURGERY CENTER 25 | Facility: SURGERY CENTER | Age: 70
End: 2025-05-09

## 2025-06-06 ENCOUNTER — OFFICE VISIT (OUTPATIENT)
Dept: URBAN - METROPOLITAN AREA SURGERY CENTER 25 | Facility: SURGERY CENTER | Age: 70
End: 2025-06-06

## 2025-06-18 ENCOUNTER — OFFICE VISIT (OUTPATIENT)
Dept: URBAN - METROPOLITAN AREA SURGERY CENTER 25 | Facility: SURGERY CENTER | Age: 70
End: 2025-06-18

## 2025-07-02 ENCOUNTER — OFFICE VISIT (OUTPATIENT)
Dept: URBAN - METROPOLITAN AREA CLINIC 113 | Facility: CLINIC | Age: 70
End: 2025-07-02

## 2025-07-29 ENCOUNTER — OFFICE VISIT (OUTPATIENT)
Dept: URBAN - METROPOLITAN AREA SURGERY CENTER 25 | Facility: SURGERY CENTER | Age: 70
End: 2025-07-29

## 2025-07-29 RX ORDER — ONDANSETRON 8 MG/1
1 TABLET ON THE TONGUE AND ALLOW TO DISSOLVE AS NEEDED TABLET, ORALLY DISINTEGRATING ORAL ONCE A DAY
Qty: 30 | Refills: 3 | Status: ACTIVE | COMMUNITY
Start: 2025-05-02

## 2025-07-29 RX ORDER — ALLOPURINOL 300 MG/1
TAKE 1 TABLET BY MOUTH ONCE DAILY TABLET ORAL
Qty: 30 | Refills: 0 | Status: ACTIVE | COMMUNITY
Start: 2018-12-07

## 2025-07-29 RX ORDER — OMEPRAZOLE 40 MG/1
1 CAPSULE 30 MINUTES BEFORE MORNING MEAL CAPSULE, DELAYED RELEASE ORAL ONCE A DAY
Qty: 90 | Refills: 3 | Status: ON HOLD | COMMUNITY
Start: 2023-12-28

## 2025-07-29 RX ORDER — OMEPRAZOLE 40 MG/1
TAKE 1 CAPSULE BY MOUTH DAILY 30 MINUTES BEFORE BREAKFAST CAPSULE, DELAYED RELEASE ORAL TWICE DAILY
Qty: 60 | Refills: 3 | Status: ACTIVE | COMMUNITY
Start: 2017-03-22

## 2025-07-29 RX ORDER — LISINOPRIL 10 MG/1
1 TABLET TABLET ORAL ONCE A DAY
Status: ON HOLD | COMMUNITY

## 2025-07-29 RX ORDER — PROMETHAZINE HYDROCHLORIDE 12.5 MG/1
1-2 TABLETS TABLET ORAL
Qty: 30 | Refills: 1 | Status: ACTIVE | COMMUNITY
Start: 2025-05-02

## 2025-07-29 RX ORDER — AMLODIPINE BESYLATE 10 MG/1
1 TABLET TABLET ORAL ONCE A DAY
Status: ACTIVE | COMMUNITY

## 2025-07-29 RX ORDER — ATORVASTATIN CALCIUM 10 MG/1
1 TABLET TABLET, FILM COATED ORAL ONCE A DAY
Status: ACTIVE | COMMUNITY

## 2025-08-12 ENCOUNTER — OFFICE VISIT (OUTPATIENT)
Dept: URBAN - METROPOLITAN AREA CLINIC 113 | Facility: CLINIC | Age: 70
End: 2025-08-12
Payer: MEDICARE

## 2025-08-12 DIAGNOSIS — K44.9 HIATAL HERNIA: ICD-10-CM

## 2025-08-12 DIAGNOSIS — K21.9 GASTROESOPHAGEAL REFLUX DISEASE, UNSPECIFIED WHETHER ESOPHAGITIS PRESENT: ICD-10-CM

## 2025-08-12 DIAGNOSIS — R68.81 EARLY SATIETY: ICD-10-CM

## 2025-08-12 DIAGNOSIS — K22.70 BARRETT'S ESOPHAGUS WITHOUT DYSPLASIA: ICD-10-CM

## 2025-08-12 DIAGNOSIS — R11.2 NAUSEA AND VOMITING IN ADULT: ICD-10-CM

## 2025-08-12 PROCEDURE — 99213 OFFICE O/P EST LOW 20 MIN: CPT | Performed by: NURSE PRACTITIONER

## 2025-08-12 RX ORDER — OMEPRAZOLE 40 MG/1
TAKE 1 CAPSULE BY MOUTH DAILY 30 MINUTES BEFORE BREAKFAST CAPSULE, DELAYED RELEASE ORAL TWICE DAILY
OUTPATIENT
Start: 2017-03-22

## 2025-08-12 RX ORDER — ATORVASTATIN CALCIUM 10 MG/1
1 TABLET TABLET, FILM COATED ORAL ONCE A DAY
Status: ACTIVE | COMMUNITY

## 2025-08-12 RX ORDER — PROMETHAZINE HYDROCHLORIDE 12.5 MG/1
1-2 TABLETS TABLET ORAL
Qty: 30 | Refills: 1 | Status: ACTIVE | COMMUNITY
Start: 2025-05-02

## 2025-08-12 RX ORDER — OMEPRAZOLE 40 MG/1
TAKE 1 CAPSULE BY MOUTH DAILY 30 MINUTES BEFORE BREAKFAST CAPSULE, DELAYED RELEASE ORAL TWICE DAILY
Qty: 60 | Refills: 3 | Status: ACTIVE | COMMUNITY
Start: 2017-03-22

## 2025-08-12 RX ORDER — LISINOPRIL 10 MG/1
1 TABLET TABLET ORAL ONCE A DAY
Status: ON HOLD | COMMUNITY

## 2025-08-12 RX ORDER — ONDANSETRON 8 MG/1
1 TABLET ON THE TONGUE AND ALLOW TO DISSOLVE AS NEEDED TABLET, ORALLY DISINTEGRATING ORAL ONCE A DAY
OUTPATIENT
Start: 2025-05-02

## 2025-08-12 RX ORDER — OMEPRAZOLE 40 MG/1
1 CAPSULE 30 MINUTES BEFORE MORNING MEAL CAPSULE, DELAYED RELEASE ORAL ONCE A DAY
Qty: 90 | Refills: 3 | Status: ON HOLD | COMMUNITY
Start: 2023-12-28

## 2025-08-12 RX ORDER — ONDANSETRON 8 MG/1
1 TABLET ON THE TONGUE AND ALLOW TO DISSOLVE AS NEEDED TABLET, ORALLY DISINTEGRATING ORAL ONCE A DAY
Qty: 30 | Refills: 3 | Status: ACTIVE | COMMUNITY
Start: 2025-05-02

## 2025-08-12 RX ORDER — AMLODIPINE BESYLATE 10 MG/1
1 TABLET TABLET ORAL ONCE A DAY
Status: ACTIVE | COMMUNITY

## 2025-08-12 RX ORDER — ALLOPURINOL 300 MG/1
TAKE 1 TABLET BY MOUTH ONCE DAILY TABLET ORAL
Qty: 30 | Refills: 0 | Status: ACTIVE | COMMUNITY
Start: 2018-12-07

## 2025-08-12 RX ORDER — PROMETHAZINE HYDROCHLORIDE 12.5 MG/1
1-2 TABLETS TABLET ORAL
OUTPATIENT
Start: 2025-05-02